# Patient Record
(demographics unavailable — no encounter records)

---

## 2024-10-25 NOTE — INTERPRETER SERVICES
[Time Spent: ____ minutes] : Total time spent using  services: [unfilled] minutes. The patient's primary language is not English thus required  services. [TWNoteComboBox1] : Green[place]ic

## 2024-10-25 NOTE — HISTORY OF PRESENT ILLNESS
[de-identified] : Patient is 54 year old female presenting to [facility], last seen on December 2022. Patient has no acute complaints, currently on no medications or prior medical history.

## 2024-10-25 NOTE — ASSESSMENT
[FreeTextEntry1] : Patient is a 54 year old female presenting to [facility] care with no acute issues  #HTN  129/88 Two year of elevated blood pressure Started on Amlodipine 10mg PO QD  #Health Care Maintenace CBC, CMP. Lipid Panel, A1c, TSH Last Pap Smear: This year in [place] reports normal Mammogram 2021 Colonoscopy 2021  RTC in 1-2 Months for bloodwork results

## 2024-10-25 NOTE — HISTORY OF PRESENT ILLNESS
[de-identified] : Patient is 54 year old female presenting to [facility], last seen on December 2022. Patient has no acute complaints, currently on no medications or prior medical history.

## 2024-10-25 NOTE — ASSESSMENT
[FreeTextEntry1] : Patient is a 54 year old female presenting to [facility] care with no acute issues  #HTN  129/88 Two year of elevated blood pressure Started on Amlodipine 10mg PO QD  #Health Care Maintenace CBC, CMP. Lipid Panel, A1c, TSH Last Pap Smear: This year in [place] reports normal Mammogram 2021 Colonoscopy 2021  RTC in 1-2 Months for bloodwork results

## 2024-10-25 NOTE — INTERPRETER SERVICES
[Time Spent: ____ minutes] : Total time spent using  services: [unfilled] minutes. The patient's primary language is not English thus required  services. [TWNoteComboBox1] : Bulgarian

## 2025-01-10 NOTE — PHYSICAL EXAM
[No Acute Distress] : no acute distress [No JVD] : no jugular venous distention [No Respiratory Distress] : no respiratory distress  [Clear to Auscultation] : lungs were clear to auscultation bilaterally [Normal Rate] : normal rate  [No Edema] : there was no peripheral edema [Soft] : abdomen soft [Non Tender] : non-tender

## 2025-01-16 NOTE — HISTORY OF PRESENT ILLNESS
[FreeTextEntry1] : Follow up [de-identified] : Case of 54-year-old female with PMHx of HTN presenting for follow up.  No acute complaints today, chronic back pain.

## 2025-01-16 NOTE — ASSESSMENT
[FreeTextEntry1] : Patient is a 54-year-old female presenting to [facility] care with no acute issues  #HTN #DLD/Pre-DM - BP today 108/74 -  - ASCVD 1.9% - c/w Amlodipine but lower to 5mg daily (patient wants to take lower dose) - Diet and exercise  #Back pain - Xray  - Meloxicam 7.5 for one week  #Health Care Maintenace - Last Pap Smear: Med-gyn clinic referral - Mammogram 2021: ordered Mammogram - Colonoscopy: refer to GI - Flu taken prior

## 2025-01-16 NOTE — ASSESSMENT
[FreeTextEntry1] : Patient is a 54-year-old female presenting to \[facility]\"" care with no acute issues  #HTN #DLD/Pre-DM - BP today 108/74 -  - ASCVD 1.9% - c/w Amlodipine but lower to 5mg daily (patient wants to take lower dose) - Diet and exercise  #Back pain - Xray  - Meloxicam 7.5 for one week  #Health Care Maintenace - Last Pap Smear: Med-gyn clinic referral - Mammogram 2021: ordered Mammogram - Colonoscopy: refer to GI - Flu taken prior

## 2025-01-16 NOTE — HISTORY OF PRESENT ILLNESS
[FreeTextEntry1] : Follow up [de-identified] : Case of 54-year-old female with PMHx of HTN presenting for follow up.  No acute complaints today, chronic back pain.

## 2025-03-14 NOTE — ASSESSMENT
[FreeTextEntry1] : This is the case of a 54-year-old female patient with PMHx of HTN and chronic back pain, who is presenting for follow-up.  #HTN - BP today 130/83 mmHg (patient did not take her med today) - well-controlled - c/w Amlodipine 5mg daily   #Pre-DM/DLD - A1c 5.7% - , rest of lipid panel wnl - ASCVD 1.9% - needs repeat labs - counseling on diet and exercise provided  #Chronic back pain - Xray lumbo-sacral spine showed severe degenerative disc disease T11-T12 - s/p 1 week course of Meloxicam 7.5  - patient reports pain is better, counseled about exercise and weight loss, will consider PT referral next visit if she does not improve   #HCM - Last Pap Smear: Med-gyn clinic referral - Mammogram 02/25: BIRADS-1 - Colonoscopy: GI referral sent - RTC in 6months or PRN

## 2025-03-14 NOTE — REVIEW OF SYSTEMS
[Fever] : no fever [Chills] : no chills [Discharge] : no discharge [Pain] : no pain [Chest Pain] : no chest pain [Palpitations] : no palpitations [Shortness Of Breath] : no shortness of breath [Cough] : no cough [Abdominal Pain] : no abdominal pain [Nausea] : no nausea [Constipation] : no constipation [Diarrhea] : diarrhea [Vomiting] : no vomiting [Dysuria] : no dysuria [Joint Pain] : no joint pain [Headache] : no headache

## 2025-03-14 NOTE — PHYSICAL EXAM
[No Acute Distress] : no acute distress [No JVD] : no jugular venous distention [No Respiratory Distress] : no respiratory distress  [Clear to Auscultation] : lungs were clear to auscultation bilaterally [Normal Rate] : normal rate  [No Edema] : there was no peripheral edema [Soft] : abdomen soft [Non Tender] : non-tender [No Accessory Muscle Use] : no accessory muscle use [Regular Rhythm] : with a regular rhythm [Normal S1, S2] : normal S1 and S2 [Non-distended] : non-distended [Normal Bowel Sounds] : normal bowel sounds [No Joint Swelling] : no joint swelling [No Rash] : no rash [No Focal Deficits] : no focal deficits [Normal Affect] : the affect was normal [Normal Insight/Judgement] : insight and judgment were intact

## 2025-03-14 NOTE — HISTORY OF PRESENT ILLNESS
[FreeTextEntry1] : 2-month follow-up [de-identified] : This is the case of a 54-year-old female patient with PMHx of HTN, pre-diabetes and chronic back pain, who is presenting for follow-up. The patient denies any complaints during this visit.

## 2025-05-09 NOTE — HISTORY OF PRESENT ILLNESS
[FreeTextEntry1] : follow up  [de-identified] : This is the case of a 54-year-old female patient with PMHx of HTN, pre-diabetes and chronic back pain, who is presenting for follow-up. The patient denies any complaints during this visit.

## 2025-05-09 NOTE — PHYSICAL EXAM
[No Acute Distress] : no acute distress [Normal Sclera/Conjunctiva] : normal sclera/conjunctiva [Normal Outer Ear/Nose] : the outer ears and nose were normal in appearance [No JVD] : no jugular venous distention [No Respiratory Distress] : no respiratory distress  [No Accessory Muscle Use] : no accessory muscle use [Clear to Auscultation] : lungs were clear to auscultation bilaterally [Normal Rate] : normal rate  [Regular Rhythm] : with a regular rhythm [Normal S1, S2] : normal S1 and S2 [Pedal Pulses Present] : the pedal pulses are present [No Edema] : there was no peripheral edema [Soft] : abdomen soft [Non Tender] : non-tender [Non-distended] : non-distended [Normal Anterior Cervical Nodes] : no anterior cervical lymphadenopathy [No CVA Tenderness] : no CVA  tenderness [No Joint Swelling] : no joint swelling [No Rash] : no rash

## 2025-05-09 NOTE — ASSESSMENT
[FreeTextEntry1] : This is the case of a 54-year-old female patient with PMHx of HTN and chronic back pain, who is presenting for follow-up.  #HTN - BP today 130/83 mmHg (patient did not take her med today) - well-controlled - c/w Amlodipine 5mg daily  #Pre-DM/DLD - A1c 5.8% - , rest of lipid panel wnl - ASCVD 1.9% - counseling on diet and exercise provided - nutritionist referral   #Chronic back pain - Xray lumbo-sacral spine showed severe degenerative disc disease T11-T12 - s/p 1 week course of Meloxicam 7.5 - patient reports pain is better, counseled about exercise and weight loss, will consider PT referral next visit if she does not improve  #HCM - Last Pap Smear: Med-gyn clinic referral - Mammogram 02/25: BIRADS-1 - Colonoscopy: GI referral sent Sep 2025  - RTC in 6months or PRN.